# Patient Record
Sex: FEMALE | Race: WHITE | NOT HISPANIC OR LATINO | Employment: OTHER | URBAN - METROPOLITAN AREA
[De-identification: names, ages, dates, MRNs, and addresses within clinical notes are randomized per-mention and may not be internally consistent; named-entity substitution may affect disease eponyms.]

---
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
## 2023-10-28 NOTE — TELEPHONE ENCOUNTER
Pharmacy calling for refills of:    D Vi-Sol    Levothyroxine 75 mcg    [address]
[facility]   Let mother know that her last well visit was 5/2018 unless she has one scheduled
Self